# Patient Record
Sex: FEMALE | Race: OTHER | Employment: UNEMPLOYED | URBAN - METROPOLITAN AREA
[De-identification: names, ages, dates, MRNs, and addresses within clinical notes are randomized per-mention and may not be internally consistent; named-entity substitution may affect disease eponyms.]

---

## 2023-09-26 ENCOUNTER — OFFICE VISIT (OUTPATIENT)
Age: 23
End: 2023-09-26
Payer: MEDICAID

## 2023-09-26 VITALS
OXYGEN SATURATION: 97 % | HEIGHT: 63 IN | TEMPERATURE: 98.4 F | HEART RATE: 54 BPM | RESPIRATION RATE: 18 BRPM | DIASTOLIC BLOOD PRESSURE: 75 MMHG | BODY MASS INDEX: 31.13 KG/M2 | WEIGHT: 175.71 LBS | SYSTOLIC BLOOD PRESSURE: 108 MMHG

## 2023-09-26 DIAGNOSIS — Z11.3 ROUTINE SCREENING FOR STI (SEXUALLY TRANSMITTED INFECTION): ICD-10-CM

## 2023-09-26 DIAGNOSIS — Z01.419 WELL WOMAN EXAM WITH ROUTINE GYNECOLOGICAL EXAM: Primary | ICD-10-CM

## 2023-09-26 DIAGNOSIS — Z23 NEED FOR INFLUENZA VACCINATION: ICD-10-CM

## 2023-09-26 PROCEDURE — 99385 PREV VISIT NEW AGE 18-39: CPT | Performed by: FAMILY MEDICINE

## 2023-09-26 PROCEDURE — PBSHW INFLUENZA, FLUCELVAX, (AGE 6 MO+), IM, PF, 0.5 ML: Performed by: FAMILY MEDICINE

## 2023-09-26 PROCEDURE — 90674 CCIIV4 VAC NO PRSV 0.5 ML IM: CPT | Performed by: FAMILY MEDICINE

## 2023-09-26 ASSESSMENT — ENCOUNTER SYMPTOMS
RESPIRATORY NEGATIVE: 1
GASTROINTESTINAL NEGATIVE: 1
EYES NEGATIVE: 1
ALLERGIC/IMMUNOLOGIC NEGATIVE: 1

## 2023-09-26 ASSESSMENT — PATIENT HEALTH QUESTIONNAIRE - PHQ9
7. TROUBLE CONCENTRATING ON THINGS, SUCH AS READING THE NEWSPAPER OR WATCHING TELEVISION: 0
6. FEELING BAD ABOUT YOURSELF - OR THAT YOU ARE A FAILURE OR HAVE LET YOURSELF OR YOUR FAMILY DOWN: 0
8. MOVING OR SPEAKING SO SLOWLY THAT OTHER PEOPLE COULD HAVE NOTICED. OR THE OPPOSITE, BEING SO FIGETY OR RESTLESS THAT YOU HAVE BEEN MOVING AROUND A LOT MORE THAN USUAL: 0
3. TROUBLE FALLING OR STAYING ASLEEP: 0
9. THOUGHTS THAT YOU WOULD BE BETTER OFF DEAD, OR OF HURTING YOURSELF: 0
2. FEELING DOWN, DEPRESSED OR HOPELESS: 1
SUM OF ALL RESPONSES TO PHQ9 QUESTIONS 1 & 2: 2
SUM OF ALL RESPONSES TO PHQ QUESTIONS 1-9: 2
10. IF YOU CHECKED OFF ANY PROBLEMS, HOW DIFFICULT HAVE THESE PROBLEMS MADE IT FOR YOU TO DO YOUR WORK, TAKE CARE OF THINGS AT HOME, OR GET ALONG WITH OTHER PEOPLE: 0
SUM OF ALL RESPONSES TO PHQ QUESTIONS 1-9: 2
1. LITTLE INTEREST OR PLEASURE IN DOING THINGS: 1
5. POOR APPETITE OR OVEREATING: 0
SUM OF ALL RESPONSES TO PHQ QUESTIONS 1-9: 2
4. FEELING TIRED OR HAVING LITTLE ENERGY: 0
SUM OF ALL RESPONSES TO PHQ QUESTIONS 1-9: 2

## 2023-09-26 NOTE — PROGRESS NOTES
After obtaining consent, and per orders of Dr. Oscar Ascencio, injection of influenza given in Right deltoid by Tess Rodriguez LPN. Patient instructed to remain in clinic for 20 minutes afterwards, and to report any adverse reaction to me immediately.

## 2023-09-26 NOTE — PROGRESS NOTES
Ignacio Hernandez (:  2000) is a 23 y.o. female,New patient, here for evaluation of the following chief complaint(s):  [facility] ([phone])    Subjective   SUBJECTIVE/OBJECTIVE:  Pt is a [id number] presenting for WWE with her friend, Yariel Doe. Age of menarche:  [age] yo  Menses: regular monthly. No concerns  Complications of menses:  none however, she is unsure if she has iron def anemia. Hx of STI: none  Current relationship status:   Contraception:  nothing. STI screening discussed:  ok with STI screening today  Pap smear: due today  Vaccinations: flu vaccination today  Done with  [id number]    History reviewed. No pertinent past medical history. History reviewed. No pertinent surgical history. Family History   Problem Relation Age of Onset    No Known Problems Mother     No Known Problems Father     No Known Problems Sister     No Known Problems Brother      Social History     Socioeconomic History    Marital status:      Spouse name: Not on file    Number of children: Not on file    Years of education: Not on file    Highest education level: Not on file   Occupational History    Not on file   Tobacco Use    Smoking status: Never    Smokeless tobacco: Never   Substance and Sexual Activity    Alcohol use: Not Currently    Drug use: Never    Sexual activity: Not on file   Other Topics Concern    Not on file   Social History Narrative    Not on file     Social Determinants of Health     Financial Resource Strain: Not on file   Food Insecurity: Not on file   Transportation Needs: Not on file   Physical Activity: Not on file   Stress: Not on file   Social Connections: Not on file   Intimate Partner Violence: Not on file   Housing Stability: Not on file     No current outpatient medications on file. No Known Allergies    Review of Systems   Constitutional: Negative. HENT: Negative. Eyes: Negative. Respiratory: Negative. Cardiovascular: Negative.

## 2023-09-27 LAB
25(OH)D3 SERPL-MCNC: <9 NG/ML (ref 30–100)
ALBUMIN SERPL-MCNC: 4 G/DL (ref 3.5–5)
ALBUMIN/GLOB SERPL: 1.2 (ref 1.1–2.2)
ALP SERPL-CCNC: 72 U/L (ref 45–117)
ALT SERPL-CCNC: 20 U/L (ref 12–78)
ANION GAP SERPL CALC-SCNC: 6 MMOL/L (ref 5–15)
AST SERPL-CCNC: 12 U/L (ref 15–37)
BASOPHILS # BLD: 0 K/UL (ref 0–0.1)
BASOPHILS NFR BLD: 1 % (ref 0–1)
BILIRUB SERPL-MCNC: 0.4 MG/DL (ref 0.2–1)
BUN SERPL-MCNC: 8 MG/DL (ref 6–20)
BUN/CREAT SERPL: 14 (ref 12–20)
CALCIUM SERPL-MCNC: 9.3 MG/DL (ref 8.5–10.1)
CHLORIDE SERPL-SCNC: 109 MMOL/L (ref 97–108)
CHOLEST SERPL-MCNC: 135 MG/DL
CO2 SERPL-SCNC: 25 MMOL/L (ref 21–32)
CREAT SERPL-MCNC: 0.57 MG/DL (ref 0.55–1.02)
DIFFERENTIAL METHOD BLD: NORMAL
EOSINOPHIL # BLD: 0.1 K/UL (ref 0–0.4)
EOSINOPHIL NFR BLD: 2 % (ref 0–7)
ERYTHROCYTE [DISTWIDTH] IN BLOOD BY AUTOMATED COUNT: 13.2 % (ref 11.5–14.5)
EST. AVERAGE GLUCOSE BLD GHB EST-MCNC: 117 MG/DL
GLOBULIN SER CALC-MCNC: 3.4 G/DL (ref 2–4)
GLUCOSE SERPL-MCNC: 90 MG/DL (ref 65–100)
HBA1C MFR BLD: 5.7 % (ref 4–5.6)
HCT VFR BLD AUTO: 40 % (ref 35–47)
HDLC SERPL-MCNC: 44 MG/DL
HDLC SERPL: 3.1 (ref 0–5)
HGB BLD-MCNC: 12.8 G/DL (ref 11.5–16)
HIV 1+2 AB+HIV1 P24 AG SERPL QL IA: NONREACTIVE
HIV 1/2 RESULT COMMENT: NORMAL
IMM GRANULOCYTES # BLD AUTO: 0 K/UL (ref 0–0.04)
IMM GRANULOCYTES NFR BLD AUTO: 0 % (ref 0–0.5)
LDLC SERPL CALC-MCNC: 69 MG/DL (ref 0–100)
LYMPHOCYTES # BLD: 1.8 K/UL (ref 0.8–3.5)
LYMPHOCYTES NFR BLD: 32 % (ref 12–49)
MCH RBC QN AUTO: 28.3 PG (ref 26–34)
MCHC RBC AUTO-ENTMCNC: 32 G/DL (ref 30–36.5)
MCV RBC AUTO: 88.3 FL (ref 80–99)
MONOCYTES # BLD: 0.4 K/UL (ref 0–1)
MONOCYTES NFR BLD: 8 % (ref 5–13)
NEUTS SEG # BLD: 3.1 K/UL (ref 1.8–8)
NEUTS SEG NFR BLD: 57 % (ref 32–75)
NRBC # BLD: 0 K/UL (ref 0–0.01)
NRBC BLD-RTO: 0 PER 100 WBC
PLATELET # BLD AUTO: 351 K/UL (ref 150–400)
PMV BLD AUTO: 11.8 FL (ref 8.9–12.9)
POTASSIUM SERPL-SCNC: 3.8 MMOL/L (ref 3.5–5.1)
PROT SERPL-MCNC: 7.4 G/DL (ref 6.4–8.2)
RBC # BLD AUTO: 4.53 M/UL (ref 3.8–5.2)
SODIUM SERPL-SCNC: 140 MMOL/L (ref 136–145)
TRIGL SERPL-MCNC: 110 MG/DL
TSH SERPL DL<=0.05 MIU/L-ACNC: 4.7 UIU/ML (ref 0.36–3.74)
VLDLC SERPL CALC-MCNC: 22 MG/DL
WBC # BLD AUTO: 5.4 K/UL (ref 3.6–11)

## 2023-09-28 DIAGNOSIS — R79.89 ELEVATED TSH: ICD-10-CM

## 2023-09-28 DIAGNOSIS — E55.9 VITAMIN D DEFICIENCY: Primary | ICD-10-CM

## 2023-09-28 RX ORDER — ERGOCALCIFEROL 1.25 MG/1
50000 CAPSULE ORAL WEEKLY
Qty: 12 CAPSULE | Refills: 1 | Status: SHIPPED | OUTPATIENT
Start: 2023-09-28

## 2023-10-01 LAB
C TRACH RRNA CVX QL NAA+PROBE: NEGATIVE
CYTOLOGIST CVX/VAG CYTO: NORMAL
CYTOLOGY CVX/VAG DOC CYTO: NORMAL
CYTOLOGY CVX/VAG DOC THIN PREP: NORMAL
DX ICD CODE: NORMAL
HPV GENOTYPE REFLEX: NORMAL
HPV I/H RISK 4 DNA CVX QL PROBE+SIG AMP: NEGATIVE
Lab: NORMAL
N GONORRHOEA RRNA CVX QL NAA+PROBE: NEGATIVE
OTHER STN SPEC: NORMAL
STAT OF ADQ CVX/VAG CYTO-IMP: NORMAL
T VAGINALIS RRNA SPEC QL NAA+PROBE: NEGATIVE

## 2023-12-05 ENCOUNTER — TELEPHONE (OUTPATIENT)
Age: 23
End: 2023-12-05

## 2023-12-05 NOTE — TELEPHONE ENCOUNTER
Eunice was called because the [employer] from she dropped off for patient Kolby has the wrong date of birth printed on the form. because this is a government form all patient identifiers must be met. Eunice didn't answer but a VM was left on [phone] advising to come  the INCOMPLETE form.

## 2024-02-01 ENCOUNTER — OFFICE VISIT (OUTPATIENT)
Age: 24
End: 2024-02-01
Payer: MEDICAID

## 2024-02-01 VITALS
DIASTOLIC BLOOD PRESSURE: 70 MMHG | HEIGHT: 63 IN | TEMPERATURE: 98.2 F | OXYGEN SATURATION: 98 % | SYSTOLIC BLOOD PRESSURE: 102 MMHG | RESPIRATION RATE: 16 BRPM | WEIGHT: 170 LBS | HEART RATE: 71 BPM | BODY MASS INDEX: 30.12 KG/M2

## 2024-02-01 DIAGNOSIS — N89.8 VAGINAL ITCHING: ICD-10-CM

## 2024-02-01 DIAGNOSIS — R30.0 DYSURIA: Primary | ICD-10-CM

## 2024-02-01 LAB
BILIRUBIN, POC: ABNORMAL
BLOOD URINE, POC: ABNORMAL
CLARITY, POC: ABNORMAL
COLOR, POC: ABNORMAL
GLUCOSE URINE, POC: ABNORMAL
HCG, PREGNANCY, URINE, POC: NEGATIVE
KETONES, POC: ABNORMAL
LEUKOCYTE EST, POC: ABNORMAL
NITRITE, POC: ABNORMAL
PH, POC: 5.5
PROTEIN, POC: ABNORMAL
SPECIFIC GRAVITY, POC: 1.03
UROBILINOGEN, POC: ABNORMAL
VALID INTERNAL CONTROL, POC: YES

## 2024-02-01 PROCEDURE — 81003 URINALYSIS AUTO W/O SCOPE: CPT | Performed by: FAMILY MEDICINE

## 2024-02-01 PROCEDURE — 99214 OFFICE O/P EST MOD 30 MIN: CPT | Performed by: FAMILY MEDICINE

## 2024-02-01 PROCEDURE — PBSHW POCT URINALYSIS DIPSTICK W/O MICROSCOPE (AUTO): Performed by: FAMILY MEDICINE

## 2024-02-01 PROCEDURE — 81025 URINE PREGNANCY TEST: CPT | Performed by: FAMILY MEDICINE

## 2024-02-01 PROCEDURE — PBSHW AMB POC URINE PREGNANCY TEST, VISUAL COLOR COMPARISON: Performed by: FAMILY MEDICINE

## 2024-02-01 RX ORDER — METRONIDAZOLE 500 MG/1
500 TABLET ORAL 2 TIMES DAILY
Qty: 14 TABLET | Refills: 0 | Status: SHIPPED | OUTPATIENT
Start: 2024-02-01 | End: 2024-02-08

## 2024-02-01 SDOH — ECONOMIC STABILITY: FOOD INSECURITY: WITHIN THE PAST 12 MONTHS, YOU WORRIED THAT YOUR FOOD WOULD RUN OUT BEFORE YOU GOT MONEY TO BUY MORE.: NEVER TRUE

## 2024-02-01 SDOH — ECONOMIC STABILITY: INCOME INSECURITY: HOW HARD IS IT FOR YOU TO PAY FOR THE VERY BASICS LIKE FOOD, HOUSING, MEDICAL CARE, AND HEATING?: NOT HARD AT ALL

## 2024-02-01 SDOH — ECONOMIC STABILITY: FOOD INSECURITY: WITHIN THE PAST 12 MONTHS, THE FOOD YOU BOUGHT JUST DIDN'T LAST AND YOU DIDN'T HAVE MONEY TO GET MORE.: NEVER TRUE

## 2024-02-01 SDOH — ECONOMIC STABILITY: HOUSING INSECURITY
IN THE LAST 12 MONTHS, WAS THERE A TIME WHEN YOU DID NOT HAVE A STEADY PLACE TO SLEEP OR SLEPT IN A SHELTER (INCLUDING NOW)?: NO

## 2024-02-01 ASSESSMENT — PATIENT HEALTH QUESTIONNAIRE - PHQ9
SUM OF ALL RESPONSES TO PHQ QUESTIONS 1-9: 0
2. FEELING DOWN, DEPRESSED OR HOPELESS: 0
SUM OF ALL RESPONSES TO PHQ QUESTIONS 1-9: 0
1. LITTLE INTEREST OR PLEASURE IN DOING THINGS: 0
SUM OF ALL RESPONSES TO PHQ QUESTIONS 1-9: 0
SUM OF ALL RESPONSES TO PHQ QUESTIONS 1-9: 0
SUM OF ALL RESPONSES TO PHQ9 QUESTIONS 1 & 2: 0

## 2024-02-01 ASSESSMENT — ENCOUNTER SYMPTOMS
ABDOMINAL PAIN: 0
VOMITING: 0
DIARRHEA: 0
NAUSEA: 0

## 2024-02-01 NOTE — PROGRESS NOTES
Rm:01    Chief Complaint   Patient presents with    Urinary Pain    Vaginal Itching        1. Have you been to the ER, urgent care clinic since your last visit?  Hospitalized since your last visit?no    2. Have you seen or consulted any other health care providers outside of the [facility] since your last visit?  Include any pap smears or colon screening. no        Social Determinants of Health     Tobacco Use: Low Risk  (2/1/2024)    Patient History     Smoking Tobacco Use: Never     Smokeless Tobacco Use: Never     Passive Exposure: Not on file   Alcohol Use: Not on file   Financial Resource Strain: Not on file   Food Insecurity: Not on file   Transportation Needs: Not on file   Physical Activity: Not on file   Stress: Not on file   Social Connections: Not on file   Intimate Partner Violence: Not on file   Depression: Not at risk (9/26/2023)    PHQ-2     PHQ-2 Score: 2   Housing Stability: Not on file   Interpersonal Safety: Not on file   Utilities: Not on file

## 2024-02-01 NOTE — PROGRESS NOTES
Kolby Butler (:  2000) is a 23 y.o. female,Established patient, here for evaluation of the following chief complaint(s):  Urinary Pain and Vaginal Itching         ASSESSMENT/PLAN:  1. Dysuria  -     POCT Urinalysis No Micro (Auto)  -     Culture, Urine; Future  2. Vaginal itching  -     Nuswab Vaginitis Plus (VG+); Future  -     AMB POC URINE PREGNANCY TEST, VISUAL COLOR COMPARISON  -     metroNIDAZOLE (FLAGYL) 500 MG tablet; Take 1 tablet by mouth 2 times daily for 7 days, Disp-14 tablet, R-0Normal  Awaiting urine culture. Neg UPT. Empiric therapy for BV while awaiting nuswab results. Clinically sx not suggestive of yeast vaginitis at this time.     Return if symptoms worsen or fail to improve.             Subjective   SUBJECTIVE/OBJECTIVE:  Pt presents as an est pt for acute concern  Pt presents with her friend, Leda.   History and physical done with  #[id number]    1. Dysuria/vaginal itching:   Duration: 1 wk  Location: urinary  Quality: burning  Pain scale: 8/10  Aggrav sx: nothing  Allev sx:  states she has been using clotrimazole cream prn OTC. They tried monistat OTC 2 mo ago that initially improved but then worsened for the past week  Assoc sx:   Patient's last menstrual period was 01/15/2024. Unsure of the exact date.   No contraception  +vaginal itching without discharge or odor.   Denies any f/c/n/v/d/c/abd pain/hematuria/back pain/+urinary frequency.         Past Medical History:   Diagnosis Date    Vitamin D deficiency 2023     History reviewed. No pertinent surgical history.   Family History   Problem Relation Age of Onset    No Known Problems Mother     No Known Problems Father     No Known Problems Sister     No Known Problems Brother      Social History     Socioeconomic History    Marital status:      Spouse name: Not on file    Number of children: Not on file    Years of education: Not on file    Highest education level: Not on file   Occupational History    Not on

## 2024-02-04 LAB
A VAGINAE DNA VAG QL NAA+PROBE: NORMAL SCORE
BACTERIA SPEC CULT: ABNORMAL
BVAB2 DNA VAG QL NAA+PROBE: NORMAL SCORE
C ALBICANS DNA VAG QL NAA+PROBE: NEGATIVE
C GLABRATA DNA VAG QL NAA+PROBE: NEGATIVE
CC UR VC: ABNORMAL
MEGA1 DNA VAG QL NAA+PROBE: NORMAL SCORE
SERVICE CMNT-IMP: ABNORMAL
SPECIMEN SOURCE: NORMAL

## 2024-02-05 DIAGNOSIS — N30.00 ACUTE CYSTITIS WITHOUT HEMATURIA: Primary | ICD-10-CM

## 2024-02-05 RX ORDER — CEFDINIR 300 MG/1
300 CAPSULE ORAL 2 TIMES DAILY
Qty: 14 CAPSULE | Refills: 0 | Status: SHIPPED | OUTPATIENT
Start: 2024-02-05 | End: 2024-02-12

## 2024-02-06 LAB
A VAGINAE DNA VAG QL NAA+PROBE: NORMAL SCORE
BVAB2 DNA VAG QL NAA+PROBE: NORMAL SCORE
C ALBICANS DNA VAG QL NAA+PROBE: NEGATIVE
C GLABRATA DNA VAG QL NAA+PROBE: NEGATIVE
C TRACH RRNA SPEC QL NAA+PROBE: NEGATIVE
MEGA1 DNA VAG QL NAA+PROBE: NORMAL SCORE
N GONORRHOEA RRNA SPEC QL NAA+PROBE: NEGATIVE
SPECIMEN SOURCE: NORMAL
T VAGINALIS RRNA SPEC QL NAA+PROBE: NEGATIVE

## 2024-02-07 ENCOUNTER — TELEPHONE (OUTPATIENT)
Age: 24
End: 2024-02-07

## 2024-02-07 NOTE — TELEPHONE ENCOUNTER
----- Message from Aissatou Reed MD sent at 2/5/2024  1:43 PM EST -----  Call:  your urine was positive for a urinary tract infection.  You may discontinue the current antibiotic and a new one is being sent to the pharmacy for this infection.  Take as prescribed and complete the course of medication.  Return if you do not improve. Have a great weekend.     (Treated with cefdinir third gen cephalosporin due to sensitivities of rocephin. Otherwise pansensitive except for bactrim.)

## 2024-02-07 NOTE — TELEPHONE ENCOUNTER
Spoke with pt using Language Services. I informed the pt that her results came back and that she has a UTI. Per Dr. Reed's orders I told the pt to discontinue using the medications that were previously prescribed on 02/01/24 and to  her new medication that was sent to her local pharmacy. No further questions or concerns.

## 2024-06-10 ENCOUNTER — TELEPHONE (OUTPATIENT)
Age: 24
End: 2024-06-10

## 2024-06-10 NOTE — TELEPHONE ENCOUNTER
Patient is requesting a medication to be called in to her pharmacy for vaginal itching and discharge. Patient is requesting medication due to no appointment availability.

## 2024-06-10 NOTE — TELEPHONE ENCOUNTER
Spoke to patient to use OTC for vaginal itching/cream. She voiced understanding. I asked if she wanted to make an appointment and she said she will wait.

## 2024-06-27 ENCOUNTER — OFFICE VISIT (OUTPATIENT)
Age: 24
End: 2024-06-27
Payer: MEDICAID

## 2024-06-27 VITALS
HEIGHT: 63 IN | HEART RATE: 68 BPM | SYSTOLIC BLOOD PRESSURE: 104 MMHG | BODY MASS INDEX: 31.43 KG/M2 | WEIGHT: 177.4 LBS | OXYGEN SATURATION: 98 % | DIASTOLIC BLOOD PRESSURE: 73 MMHG | TEMPERATURE: 98.8 F | RESPIRATION RATE: 18 BRPM

## 2024-06-27 DIAGNOSIS — R30.0 DYSURIA: Primary | ICD-10-CM

## 2024-06-27 LAB
BILIRUBIN, URINE, POC: NEGATIVE
BLOOD URINE, POC: NEGATIVE
GLUCOSE URINE, POC: NEGATIVE
KETONES, URINE, POC: NEGATIVE
LEUKOCYTE ESTERASE, URINE, POC: NEGATIVE
NITRITE, URINE, POC: NEGATIVE
PH, URINE, POC: 6 (ref 4.6–8)
PROTEIN,URINE, POC: NEGATIVE
SPECIFIC GRAVITY, URINE, POC: 1.03 (ref 1–1.03)
URINALYSIS CLARITY, POC: CLEAR
URINALYSIS COLOR, POC: YELLOW
UROBILINOGEN, POC: NORMAL

## 2024-06-27 PROCEDURE — 99213 OFFICE O/P EST LOW 20 MIN: CPT | Performed by: FAMILY MEDICINE

## 2024-06-27 PROCEDURE — 81003 URINALYSIS AUTO W/O SCOPE: CPT | Performed by: FAMILY MEDICINE

## 2024-06-27 PROCEDURE — PBSHW AMB POC URINALYSIS DIP STICK AUTO W/O MICRO: Performed by: FAMILY MEDICINE

## 2024-06-27 RX ORDER — CEFDINIR 300 MG/1
300 CAPSULE ORAL 2 TIMES DAILY
Qty: 14 CAPSULE | Refills: 0 | Status: SHIPPED | OUTPATIENT
Start: 2024-06-27 | End: 2024-07-04

## 2024-06-27 SDOH — ECONOMIC STABILITY: FOOD INSECURITY: WITHIN THE PAST 12 MONTHS, YOU WORRIED THAT YOUR FOOD WOULD RUN OUT BEFORE YOU GOT MONEY TO BUY MORE.: NEVER TRUE

## 2024-06-27 SDOH — ECONOMIC STABILITY: FOOD INSECURITY: WITHIN THE PAST 12 MONTHS, THE FOOD YOU BOUGHT JUST DIDN'T LAST AND YOU DIDN'T HAVE MONEY TO GET MORE.: NEVER TRUE

## 2024-06-27 SDOH — ECONOMIC STABILITY: INCOME INSECURITY: HOW HARD IS IT FOR YOU TO PAY FOR THE VERY BASICS LIKE FOOD, HOUSING, MEDICAL CARE, AND HEATING?: NOT HARD AT ALL

## 2024-06-27 ASSESSMENT — PATIENT HEALTH QUESTIONNAIRE - PHQ9
SUM OF ALL RESPONSES TO PHQ QUESTIONS 1-9: 0
1. LITTLE INTEREST OR PLEASURE IN DOING THINGS: NOT AT ALL
2. FEELING DOWN, DEPRESSED OR HOPELESS: NOT AT ALL
SUM OF ALL RESPONSES TO PHQ9 QUESTIONS 1 & 2: 0
SUM OF ALL RESPONSES TO PHQ QUESTIONS 1-9: 0

## 2024-06-27 NOTE — PATIENT INSTRUCTIONS
Take the medication as prescribed.  You will be notified of the culture results and if there is a change needed in the antibiotic.  You can try over the counter vaginal itch creams like vagisil to help in the meantime. Make sure to try to urinate after intercourse, no submersion bathing (only showers), and wiping/cleaning from front to back.

## 2024-06-27 NOTE — PROGRESS NOTES
Rm:02    Chief Complaint   Patient presents with    Vaginal Itching    Tried OTC and it helped but symptoms came back after running out of medication.   No burning when peeing   For about a month     \"Have you been to the ER, urgent care clinic since your last visit?  Hospitalized since your last visit?\"    NO    “Have you seen or consulted any other health care providers outside of [facility] since your last visit?”    NO            Click Here for Release of Records Request   
children: Not on file    Years of education: Not on file    Highest education level: Not on file   Occupational History    Not on file   Tobacco Use    Smoking status: Never    Smokeless tobacco: Never   Substance and Sexual Activity    Alcohol use: Not Currently    Drug use: Never    Sexual activity: Not on file   Other Topics Concern    Not on file   Social History Narrative    Not on file     Social Determinants of Health     Financial Resource Strain: Low Risk  (6/27/2024)    Overall Financial Resource Strain (CARDIA)     Difficulty of Paying Living Expenses: Not hard at all   Food Insecurity: No Food Insecurity (6/27/2024)    Hunger Vital Sign     Worried About Running Out of Food in the Last Year: Never true     Ran Out of Food in the Last Year: Never true   Transportation Needs: Unknown (6/27/2024)    PRAPARE - Transportation     Lack of Transportation (Medical): Not on file     Lack of Transportation (Non-Medical): No   Physical Activity: Not on file   Stress: Not on file   Social Connections: Not on file   Intimate Partner Violence: Not on file   Housing Stability: Unknown (6/27/2024)    Housing Stability Vital Sign     Unable to Pay for Housing in the Last Year: Not on file     Number of Places Lived in the Last Year: Not on file     Unstable Housing in the Last Year: No       Current Outpatient Medications:     cefdinir (OMNICEF) 300 MG capsule, Take 1 capsule by mouth 2 times daily for 7 days, Disp: 14 capsule, Rfl: 0    vitamin D (ERGOCALCIFEROL) 1.25 MG (15271 UT) CAPS capsule, Take 1 capsule by mouth once a week, Disp: 12 capsule, Rfl: 1  No Known Allergies    Review of Systems   Constitutional: Negative.    Gastrointestinal: Negative.    Genitourinary:  Positive for dysuria. Negative for frequency, hematuria, urgency, vaginal bleeding, vaginal discharge and vaginal pain.          Objective   Vitals:    06/27/24 1406   BP: 104/73   Site: Left Upper Arm   Position: Sitting   Cuff Size: Large Adult

## 2024-06-28 LAB
BACTERIA SPEC CULT: NORMAL
CC UR VC: NORMAL
SERVICE CMNT-IMP: NORMAL

## 2024-07-01 ASSESSMENT — ENCOUNTER SYMPTOMS: GASTROINTESTINAL NEGATIVE: 1

## 2024-08-05 ENCOUNTER — TELEPHONE (OUTPATIENT)
Age: 24
End: 2024-08-05

## 2024-08-05 NOTE — TELEPHONE ENCOUNTER
I spoke with the patient using Language services ([id number]). I explained that  would like to see the patient in office to get a urine sample and see what is the cause of there reoccurring infections.  needs to make sure that the medication compatible with the infections. Appt made for 08/12/2024 patient did not want anything this week.

## 2024-08-05 NOTE — TELEPHONE ENCOUNTER
Pt requests (via [facility] ) refill to treat recurring issue in her private area; pt could not remember the name of the RX. Pt refused an appt to see provider on 8/6/24 and would like a refill only. Please CB pt to verify RX and follow-up with pt.

## 2024-08-12 ENCOUNTER — OFFICE VISIT (OUTPATIENT)
Age: 24
End: 2024-08-12
Payer: MEDICAID

## 2024-08-12 VITALS
SYSTOLIC BLOOD PRESSURE: 114 MMHG | DIASTOLIC BLOOD PRESSURE: 76 MMHG | RESPIRATION RATE: 18 BRPM | HEIGHT: 63 IN | HEART RATE: 90 BPM | WEIGHT: 178.2 LBS | OXYGEN SATURATION: 97 % | BODY MASS INDEX: 31.57 KG/M2 | TEMPERATURE: 98.5 F

## 2024-08-12 DIAGNOSIS — L30.4 INTERTRIGO: Primary | ICD-10-CM

## 2024-08-12 DIAGNOSIS — R30.0 DYSURIA: ICD-10-CM

## 2024-08-12 DIAGNOSIS — R79.89 ELEVATED TSH: ICD-10-CM

## 2024-08-12 DIAGNOSIS — E55.9 VITAMIN D DEFICIENCY: ICD-10-CM

## 2024-08-12 LAB
BILIRUBIN, URINE, POC: NEGATIVE
BLOOD URINE, POC: NEGATIVE
GLUCOSE URINE, POC: NEGATIVE
HCG, PREGNANCY, URINE, POC: NEGATIVE
KETONES, URINE, POC: NEGATIVE
LEUKOCYTE ESTERASE, URINE, POC: NEGATIVE
NITRITE, URINE, POC: NEGATIVE
PH, URINE, POC: 7 (ref 4.6–8)
PROTEIN,URINE, POC: NEGATIVE
SPECIFIC GRAVITY, URINE, POC: 1.01 (ref 1–1.03)
URINALYSIS CLARITY, POC: CLEAR
URINALYSIS COLOR, POC: YELLOW
UROBILINOGEN, POC: NORMAL
VALID INTERNAL CONTROL, POC: YES

## 2024-08-12 PROCEDURE — PBSHW AMB POC URINE PREGNANCY TEST, VISUAL COLOR COMPARISON: Performed by: FAMILY MEDICINE

## 2024-08-12 PROCEDURE — PBSHW AMB POC URINALYSIS DIP STICK AUTO W/O MICRO: Performed by: FAMILY MEDICINE

## 2024-08-12 PROCEDURE — 99214 OFFICE O/P EST MOD 30 MIN: CPT | Performed by: FAMILY MEDICINE

## 2024-08-12 PROCEDURE — 81025 URINE PREGNANCY TEST: CPT | Performed by: FAMILY MEDICINE

## 2024-08-12 PROCEDURE — 81003 URINALYSIS AUTO W/O SCOPE: CPT | Performed by: FAMILY MEDICINE

## 2024-08-12 RX ORDER — CLOTRIMAZOLE 1 %
CREAM (GRAM) TOPICAL
Qty: 60 G | Refills: 0 | Status: SHIPPED | OUTPATIENT
Start: 2024-08-12 | End: 2024-08-19

## 2024-08-12 SDOH — ECONOMIC STABILITY: FOOD INSECURITY: WITHIN THE PAST 12 MONTHS, THE FOOD YOU BOUGHT JUST DIDN'T LAST AND YOU DIDN'T HAVE MONEY TO GET MORE.: NEVER TRUE

## 2024-08-12 SDOH — ECONOMIC STABILITY: FOOD INSECURITY: WITHIN THE PAST 12 MONTHS, YOU WORRIED THAT YOUR FOOD WOULD RUN OUT BEFORE YOU GOT MONEY TO BUY MORE.: NEVER TRUE

## 2024-08-12 SDOH — ECONOMIC STABILITY: INCOME INSECURITY: HOW HARD IS IT FOR YOU TO PAY FOR THE VERY BASICS LIKE FOOD, HOUSING, MEDICAL CARE, AND HEATING?: NOT HARD AT ALL

## 2024-08-12 ASSESSMENT — PATIENT HEALTH QUESTIONNAIRE - PHQ9
SUM OF ALL RESPONSES TO PHQ QUESTIONS 1-9: 0
SUM OF ALL RESPONSES TO PHQ9 QUESTIONS 1 & 2: 0
2. FEELING DOWN, DEPRESSED OR HOPELESS: NOT AT ALL
SUM OF ALL RESPONSES TO PHQ QUESTIONS 1-9: 0
1. LITTLE INTEREST OR PLEASURE IN DOING THINGS: NOT AT ALL

## 2024-08-12 ASSESSMENT — ENCOUNTER SYMPTOMS: GASTROINTESTINAL NEGATIVE: 1

## 2024-08-12 NOTE — PROGRESS NOTES
RM : 01    Chief Complaint   Patient presents with    Vaginal Itching       Vitals:    08/12/24 1352   BP: 114/76   Pulse: 90   Resp: 18   Temp: 98.5 °F (36.9 °C)   SpO2: 97%         \"Have you been to the ER, urgent care clinic since your last visit?  Hospitalized since your last visit?\"    NO    “Have you seen or consulted any other health care providers outside of [facility] since your last visit?”    NO            Click Here for Release of Records Request      AVS  education, follow up, and recommendations provided and addressed with patient.   
using toothpaste.   Patient's last menstrual period was 08/01/2024 (exact date).  ROS: denies any f/n/v/abd pain/+vaginal discharge white/+vaginal itching/denies any hematuria or urinary urgency or frequency.   Denies any new partners and otherwise low risk for STI.   Currently .   Denies any vaginal discharge or odor.           Past Medical History:   Diagnosis Date    Vitamin D deficiency 9/28/2023     History reviewed. No pertinent surgical history.   Family History   Problem Relation Age of Onset    No Known Problems Mother     No Known Problems Father     No Known Problems Sister     No Known Problems Brother      Social History     Socioeconomic History    Marital status:      Spouse name: Not on file    Number of children: Not on file    Years of education: Not on file    Highest education level: Not on file   Occupational History    Not on file   Tobacco Use    Smoking status: Never    Smokeless tobacco: Never   Substance and Sexual Activity    Alcohol use: Not Currently    Drug use: Never    Sexual activity: Not on file   Other Topics Concern    Not on file   Social History Narrative    Not on file     Social Determinants of Health     Financial Resource Strain: Low Risk  (8/12/2024)    Overall Financial Resource Strain (CARDIA)     Difficulty of Paying Living Expenses: Not hard at all   Food Insecurity: No Food Insecurity (8/12/2024)    Hunger Vital Sign     Worried About Running Out of Food in the Last Year: Never true     Ran Out of Food in the Last Year: Never true   Transportation Needs: Unknown (8/12/2024)    PRAPARE - Transportation     Lack of Transportation (Medical): Not on file     Lack of Transportation (Non-Medical): No   Physical Activity: Not on file   Stress: Not on file   Social Connections: Not on file   Intimate Partner Violence: Not on file   Housing Stability: Unknown (8/12/2024)    Housing Stability Vital Sign     Unable to Pay for Housing in the Last Year: Not on file

## 2024-08-12 NOTE — PATIENT INSTRUCTIONS
Your cultures are being sent for which will determine any additional medication recommendations.   Use the topical cream as directed  Try to cut back on waxing as that may increase the likelihood of the itching.

## 2024-08-13 LAB
25(OH)D3 SERPL-MCNC: 12.7 NG/ML (ref 30–100)
BACTERIA SPEC CULT: ABNORMAL
CC UR VC: ABNORMAL
SERVICE CMNT-IMP: ABNORMAL

## 2024-08-13 NOTE — RESULT ENCOUNTER NOTE
Letter:  all of your labs are within acceptable limits except:   1. The vitamin D is low. You can improve this level with vitamin D 2422-8756 units daily.    Continue with the recommendations as discussed at your appt. Have a great week.

## 2024-08-14 ENCOUNTER — TELEPHONE (OUTPATIENT)
Age: 24
End: 2024-08-14

## 2024-08-14 LAB
Lab: ABNORMAL
T4 FREE SERPL-MCNC: 1.13 NG/DL (ref 0.82–1.77)
THYROID PEROXIDASE ANTIBODY: 55 IU/ML (ref 0–34)
TSH SERPL DL<=0.05 MIU/L-ACNC: 5.52 UIU/ML (ref 0.45–4.5)

## 2024-08-14 NOTE — TELEPHONE ENCOUNTER
----- Message from Dr. Aissatou Reed MD sent at 8/14/2024  2:55 PM EDT -----  Call:  your urine is positive for gram positive cocci which appear to be a contaminant and due to the low bacterial count this is not indicative of a urinary tract infection.  Continue with the recommendations as discussed at your appt.  Return sooner if you do not improve as expected. Have a great weekend.

## 2024-08-14 NOTE — RESULT ENCOUNTER NOTE
Call:  your urine is positive for gram positive cocci which appear to be a contaminant and due to the low bacterial count this is not indicative of a urinary tract infection.  Continue with the recommendations as discussed at your appt.  Return sooner if you do not improve as expected. Have a great weekend.

## 2024-08-14 NOTE — TELEPHONE ENCOUNTER
I have attempted without success to contact this patient by phone to discuss lab results. Patient results are negative for an infection. And a copy of the results are being mailed to the patient.

## 2024-08-16 LAB
A VAGINAE DNA VAG QL NAA+PROBE: NORMAL SCORE
BVAB2 DNA VAG QL NAA+PROBE: NORMAL SCORE
C ALBICANS DNA VAG QL NAA+PROBE: NEGATIVE
C GLABRATA DNA VAG QL NAA+PROBE: NEGATIVE
C TRACH RRNA SPEC QL NAA+PROBE: NEGATIVE
CANDIDA KRUSEI: NEGATIVE
CANDIDA LUSITANIAE, NAA: NEGATIVE
CANDIDA PARAPSILOSIS/TROPICALIS: NEGATIVE
MEGA1 DNA VAG QL NAA+PROBE: NORMAL SCORE
N GONORRHOEA RRNA SPEC QL NAA+PROBE: NEGATIVE
T VAGINALIS RRNA SPEC QL NAA+PROBE: NEGATIVE

## 2024-08-20 DIAGNOSIS — E06.3 HYPOTHYROIDISM DUE TO HASHIMOTO'S THYROIDITIS: Primary | ICD-10-CM

## 2024-08-20 DIAGNOSIS — E03.8 HYPOTHYROIDISM DUE TO HASHIMOTO'S THYROIDITIS: Primary | ICD-10-CM

## 2025-06-04 ENCOUNTER — OFFICE VISIT (OUTPATIENT)
Age: 25
End: 2025-06-04
Payer: MEDICAID

## 2025-06-04 VITALS
DIASTOLIC BLOOD PRESSURE: 61 MMHG | TEMPERATURE: 98.4 F | RESPIRATION RATE: 16 BRPM | BODY MASS INDEX: 31.86 KG/M2 | HEART RATE: 72 BPM | SYSTOLIC BLOOD PRESSURE: 90 MMHG | OXYGEN SATURATION: 99 % | WEIGHT: 179.8 LBS | HEIGHT: 63 IN

## 2025-06-04 DIAGNOSIS — L30.4 INTERTRIGO: Primary | ICD-10-CM

## 2025-06-04 PROCEDURE — 99214 OFFICE O/P EST MOD 30 MIN: CPT | Performed by: FAMILY MEDICINE

## 2025-06-04 RX ORDER — HYDROCORTISONE 25 MG/G
CREAM TOPICAL
Qty: 28 G | Refills: 1 | Status: SHIPPED | OUTPATIENT
Start: 2025-06-04

## 2025-06-04 RX ORDER — CLOTRIMAZOLE 1 %
CREAM (GRAM) TOPICAL
Qty: 60 G | Refills: 1 | Status: SHIPPED | OUTPATIENT
Start: 2025-06-04 | End: 2025-06-11

## 2025-06-04 SDOH — ECONOMIC STABILITY: FOOD INSECURITY: WITHIN THE PAST 12 MONTHS, YOU WORRIED THAT YOUR FOOD WOULD RUN OUT BEFORE YOU GOT MONEY TO BUY MORE.: NEVER TRUE

## 2025-06-04 SDOH — ECONOMIC STABILITY: FOOD INSECURITY: WITHIN THE PAST 12 MONTHS, THE FOOD YOU BOUGHT JUST DIDN'T LAST AND YOU DIDN'T HAVE MONEY TO GET MORE.: NEVER TRUE

## 2025-06-04 ASSESSMENT — PATIENT HEALTH QUESTIONNAIRE - PHQ9
SUM OF ALL RESPONSES TO PHQ QUESTIONS 1-9: 0
2. FEELING DOWN, DEPRESSED OR HOPELESS: NOT AT ALL
SUM OF ALL RESPONSES TO PHQ QUESTIONS 1-9: 0
1. LITTLE INTEREST OR PLEASURE IN DOING THINGS: NOT AT ALL

## 2025-06-04 NOTE — PROGRESS NOTES
RM:2    Chief Complaint   Patient presents with    Acute Visit     Patient is here for vaginal issues. Patient states that she is having severe vaginal itching for last 3 weeks. Patient states that she has had this itching before and was given a cream. Patient states she has nose bleeds in the left nostril over the last week and is bright red.        Fasting No    Vitals:    06/04/25 1624   BP: 90/61   BP Site: Left Upper Arm   Patient Position: Sitting   BP Cuff Size: Large Adult   Pulse: 72   Resp: 16   Temp: 98.4 °F (36.9 °C)   TempSrc: Oral   SpO2: 99%   Weight: 81.6 kg (179 lb 12.8 oz)   Height: 1.6 m (5' 3\")        Have you been to the ER, urgent care clinic since your last visit?  Hospitalized since your last visit?   NO    Have you seen or consulted any other health care providers outside our system since your last visit?   NO       Aydee #[id number]          Click Here for Release of Records Request   AVS  education, follow up, and recommendations provided and addressed with patient.  services used to advise patient   
1 % cream, Apply topically 2 times daily., Disp: 60 g, Rfl: 1    hydrocortisone 2.5 % cream, Apply topically 2 times daily for 1 wk, then apply daily for 1 wk, then every other day for 1 wk., Disp: 28 g, Rfl: 1  No Known Allergies         Objective   Vitals:    06/04/25 1624   BP: 90/61   BP Site: Left Upper Arm   Patient Position: Sitting   BP Cuff Size: Large Adult   Pulse: 72   Resp: 16   Temp: 98.4 °F (36.9 °C)   TempSrc: Oral   SpO2: 99%   Weight: 81.6 kg (179 lb 12.8 oz)   Height: 1.6 m (5' 3\")      Physical Exam  Runny nose and congestion noted  Clear to auscultation, no wheezing, rales or rhonchi  Physical Exam  Vitals and nursing note reviewed.   Constitutional:       General: She is not in acute distress.     Appearance: Normal appearance. She is obese. She is not ill-appearing or toxic-appearing.   HENT:      Head: Normocephalic and atraumatic.      Nose: Congestion and rhinorrhea (clear) present.      Comments: No epistaxis  Cardiovascular:      Rate and Rhythm: Normal rate and regular rhythm.      Heart sounds: Normal heart sounds. No murmur heard.     No friction rub. No gallop.   Pulmonary:      Effort: Pulmonary effort is normal. No respiratory distress.      Breath sounds: Normal breath sounds. No wheezing, rhonchi or rales.   Abdominal:      General: Abdomen is flat. Bowel sounds are normal.      Palpations: Abdomen is soft.      Tenderness: There is no abdominal tenderness. There is no guarding or rebound.   Neurological:      Mental Status: She is alert.              An electronic signature was used to authenticate this note.    --Thomas Reed MD

## 2025-06-11 ENCOUNTER — OFFICE VISIT (OUTPATIENT)
Age: 25
End: 2025-06-11
Payer: MEDICAID

## 2025-06-11 VITALS
OXYGEN SATURATION: 98 % | TEMPERATURE: 98.3 F | HEIGHT: 63 IN | WEIGHT: 181.2 LBS | SYSTOLIC BLOOD PRESSURE: 107 MMHG | DIASTOLIC BLOOD PRESSURE: 74 MMHG | BODY MASS INDEX: 32.11 KG/M2 | RESPIRATION RATE: 16 BRPM | HEART RATE: 64 BPM

## 2025-06-11 DIAGNOSIS — R73.03 PREDIABETES: Primary | ICD-10-CM

## 2025-06-11 LAB
GLUCOSE, POC: 99 MG/DL
HBA1C MFR BLD: 5.6 %

## 2025-06-11 PROCEDURE — 83036 HEMOGLOBIN GLYCOSYLATED A1C: CPT | Performed by: FAMILY MEDICINE

## 2025-06-11 PROCEDURE — 99213 OFFICE O/P EST LOW 20 MIN: CPT | Performed by: FAMILY MEDICINE

## 2025-06-11 PROCEDURE — PBSHW AMB POC HEMOGLOBIN A1C: Performed by: FAMILY MEDICINE

## 2025-06-11 PROCEDURE — 82962 GLUCOSE BLOOD TEST: CPT | Performed by: FAMILY MEDICINE

## 2025-06-11 PROCEDURE — PBSHW AMB POC GLUCOSE BLOOD, BY GLUCOSE MONITORING DEVICE: Performed by: FAMILY MEDICINE

## 2025-06-11 SDOH — ECONOMIC STABILITY: FOOD INSECURITY: WITHIN THE PAST 12 MONTHS, YOU WORRIED THAT YOUR FOOD WOULD RUN OUT BEFORE YOU GOT MONEY TO BUY MORE.: NEVER TRUE

## 2025-06-11 SDOH — ECONOMIC STABILITY: FOOD INSECURITY: WITHIN THE PAST 12 MONTHS, THE FOOD YOU BOUGHT JUST DIDN'T LAST AND YOU DIDN'T HAVE MONEY TO GET MORE.: NEVER TRUE

## 2025-06-11 ASSESSMENT — PATIENT HEALTH QUESTIONNAIRE - PHQ9
1. LITTLE INTEREST OR PLEASURE IN DOING THINGS: SEVERAL DAYS
SUM OF ALL RESPONSES TO PHQ QUESTIONS 1-9: 2
2. FEELING DOWN, DEPRESSED OR HOPELESS: SEVERAL DAYS
SUM OF ALL RESPONSES TO PHQ QUESTIONS 1-9: 2

## 2025-06-11 NOTE — PROGRESS NOTES
Kolby Butler (:  2000) is a 24 y.o. female,Established patient, here for evaluation of the following chief complaint(s):  acute visit  (Pt is concerned about blood sugar being high and wants it checked )      Assessment & Plan   ASSESSMENT/PLAN:  Assessment & Plan  1. Prediabetes. Chronic, stable, at goal.  Encouraged lifestyle changes and information given in AVS for diet and exercise.   - Random glucose level is 99, which is within the normal range. Hemoglobin A1c is at the upper limit of normal at 5.6.  - Reports symptoms of increased thirst, frequent urination, and burning sensation in fingertips.  - Not currently following a low-carb diet or regular exercise regimen.  - Advised to follow a low-carb diet and engage in at least 150 minutes of exercise per week, ideally 30 minutes five times a week. Information about prediabetes, a low-carb diet, and exercise recommendations will be provided in the after-visit summary.    Results for orders placed or performed in visit on 25   AMB POC GLUCOSE BLOOD, BY GLUCOSE MONITORING DEVICE   Result Value Ref Range    Glucose, POC 99 MG/DL   AMB POC HEMOGLOBIN A1C   Result Value Ref Range    Hemoglobin A1C, POC 5.6 %         1. Prediabetes  -     AMB POC GLUCOSE BLOOD, BY GLUCOSE MONITORING DEVICE  -     AMB POC HEMOGLOBIN A1C      Return if symptoms worsen or fail to improve.           The patient (or guardian, if applicable) and other individuals in attendance with the patient were advised that Artificial Intelligence will be utilized during this visit to record and process the conversation to generate a clinical note. The patient (or guardian, if applicable) and other individuals in attendance at the appointment consented to the use of AI, including the recording.      Subjective   SUBJECTIVE/OBJECTIVE:  History of Present Illness  The patient presents for evaluation of blood sugar. She is accompanied by an .    She has been monitoring her blood

## 2025-06-11 NOTE — PROGRESS NOTES
RM:2    Chief Complaint   Patient presents with    acute visit      Pt is concerned about blood sugar being high and wants it checked        Vitals:    06/11/25 1114   BP: 107/74   BP Site: Left Upper Arm   Patient Position: Sitting   BP Cuff Size: Large Adult   Pulse: 64   Resp: 16   Temp: 98.3 °F (36.8 °C)   TempSrc: Oral   SpO2: 98%   Weight: 82.2 kg (181 lb 3.2 oz)   Height: 1.6 m (5' 3\")        FASTING: Yes    \"Have you been to the ER, urgent care clinic since your last visit?  Hospitalized since your last visit?\"    NO    “Have you seen or consulted any other health care providers outside of [facility] since your last visit?”    NO    - [facility] - [phone]        Click Here for Release of Records Request

## 2025-07-11 ENCOUNTER — OFFICE VISIT (OUTPATIENT)
Age: 25
End: 2025-07-11
Payer: MEDICAID

## 2025-07-11 VITALS
OXYGEN SATURATION: 98 % | RESPIRATION RATE: 18 BRPM | HEART RATE: 64 BPM | WEIGHT: 178.8 LBS | HEIGHT: 63 IN | SYSTOLIC BLOOD PRESSURE: 113 MMHG | DIASTOLIC BLOOD PRESSURE: 84 MMHG | TEMPERATURE: 98.3 F | BODY MASS INDEX: 31.68 KG/M2

## 2025-07-11 DIAGNOSIS — L30.4 INTERTRIGO: ICD-10-CM

## 2025-07-11 DIAGNOSIS — E55.9 VITAMIN D DEFICIENCY: ICD-10-CM

## 2025-07-11 DIAGNOSIS — Z00.00 WELL WOMAN EXAM WITHOUT GYNECOLOGICAL EXAM: Primary | ICD-10-CM

## 2025-07-11 DIAGNOSIS — R73.03 PREDIABETES: ICD-10-CM

## 2025-07-11 DIAGNOSIS — E06.3 HYPOTHYROIDISM DUE TO HASHIMOTO'S THYROIDITIS: ICD-10-CM

## 2025-07-11 PROCEDURE — 99395 PREV VISIT EST AGE 18-39: CPT | Performed by: FAMILY MEDICINE

## 2025-07-11 RX ORDER — THERMOMETER, ELECTRONIC,ORAL
EACH MISCELLANEOUS 2 TIMES DAILY
COMMUNITY
Start: 2025-06-24 | End: 2025-07-11 | Stop reason: SDUPTHER

## 2025-07-11 RX ORDER — HYDROCORTISONE 25 MG/G
CREAM TOPICAL
Qty: 28 G | Refills: 1 | Status: SHIPPED | OUTPATIENT
Start: 2025-07-11

## 2025-07-11 RX ORDER — THERMOMETER, ELECTRONIC,ORAL
EACH MISCELLANEOUS 2 TIMES DAILY
Qty: 42 G | Refills: 0 | Status: SHIPPED | OUTPATIENT
Start: 2025-07-11

## 2025-07-11 SDOH — ECONOMIC STABILITY: FOOD INSECURITY: WITHIN THE PAST 12 MONTHS, YOU WORRIED THAT YOUR FOOD WOULD RUN OUT BEFORE YOU GOT MONEY TO BUY MORE.: NEVER TRUE

## 2025-07-11 SDOH — ECONOMIC STABILITY: FOOD INSECURITY: WITHIN THE PAST 12 MONTHS, THE FOOD YOU BOUGHT JUST DIDN'T LAST AND YOU DIDN'T HAVE MONEY TO GET MORE.: NEVER TRUE

## 2025-07-11 ASSESSMENT — PATIENT HEALTH QUESTIONNAIRE - PHQ9
SUM OF ALL RESPONSES TO PHQ QUESTIONS 1-9: 0
SUM OF ALL RESPONSES TO PHQ QUESTIONS 1-9: 0
2. FEELING DOWN, DEPRESSED OR HOPELESS: NOT AT ALL
1. LITTLE INTEREST OR PLEASURE IN DOING THINGS: NOT AT ALL
SUM OF ALL RESPONSES TO PHQ QUESTIONS 1-9: 0
SUM OF ALL RESPONSES TO PHQ QUESTIONS 1-9: 0

## 2025-07-11 ASSESSMENT — ENCOUNTER SYMPTOMS
GASTROINTESTINAL NEGATIVE: 1
ALLERGIC/IMMUNOLOGIC NEGATIVE: 1
RESPIRATORY NEGATIVE: 1
EYES NEGATIVE: 1

## 2025-07-11 NOTE — CONSULTS
Session ID: [id number]  Session Duration: Longer than 54 minutes  Language: Caitlyn   ID: #[id number]   Name: Cami

## 2025-07-13 LAB
25(OH)D3 SERPL-MCNC: <9 NG/ML (ref 30–100)
ALBUMIN SERPL-MCNC: 4.1 G/DL (ref 3.5–5)
ALBUMIN/GLOB SERPL: 1.2 (ref 1.1–2.2)
ALP SERPL-CCNC: 76 U/L (ref 45–117)
ALT SERPL-CCNC: 30 U/L (ref 12–78)
ANION GAP SERPL CALC-SCNC: 5 MMOL/L (ref 2–12)
AST SERPL-CCNC: 21 U/L (ref 15–37)
BASOPHILS # BLD: 0.03 K/UL (ref 0–0.1)
BASOPHILS NFR BLD: 0.5 % (ref 0–1)
BILIRUB SERPL-MCNC: 0.4 MG/DL (ref 0.2–1)
BUN SERPL-MCNC: 10 MG/DL (ref 6–20)
BUN/CREAT SERPL: 15 (ref 12–20)
CALCIUM SERPL-MCNC: 9.3 MG/DL (ref 8.5–10.1)
CHLORIDE SERPL-SCNC: 107 MMOL/L (ref 97–108)
CHOLEST SERPL-MCNC: 166 MG/DL
CO2 SERPL-SCNC: 26 MMOL/L (ref 21–32)
CREAT SERPL-MCNC: 0.68 MG/DL (ref 0.55–1.02)
DIFFERENTIAL METHOD BLD: NORMAL
EOSINOPHIL # BLD: 0.18 K/UL (ref 0–0.4)
EOSINOPHIL NFR BLD: 3.3 % (ref 0–7)
ERYTHROCYTE [DISTWIDTH] IN BLOOD BY AUTOMATED COUNT: 12.8 % (ref 11.5–14.5)
EST. AVERAGE GLUCOSE BLD GHB EST-MCNC: 111 MG/DL
GLOBULIN SER CALC-MCNC: 3.5 G/DL (ref 2–4)
GLUCOSE SERPL-MCNC: 90 MG/DL (ref 65–100)
HBA1C MFR BLD: 5.5 % (ref 4–5.6)
HCT VFR BLD AUTO: 40.7 % (ref 35–47)
HDLC SERPL-MCNC: 47 MG/DL
HDLC SERPL: 3.5 (ref 0–5)
HGB BLD-MCNC: 12.7 G/DL (ref 11.5–16)
IMM GRANULOCYTES # BLD AUTO: 0.01 K/UL (ref 0–0.04)
IMM GRANULOCYTES NFR BLD AUTO: 0.2 % (ref 0–0.5)
LDLC SERPL CALC-MCNC: 96.2 MG/DL (ref 0–100)
LYMPHOCYTES # BLD: 1.76 K/UL (ref 0.8–3.5)
LYMPHOCYTES NFR BLD: 31.8 % (ref 12–49)
MCH RBC QN AUTO: 28 PG (ref 26–34)
MCHC RBC AUTO-ENTMCNC: 31.2 G/DL (ref 30–36.5)
MCV RBC AUTO: 89.6 FL (ref 80–99)
MONOCYTES # BLD: 0.42 K/UL (ref 0–1)
MONOCYTES NFR BLD: 7.6 % (ref 5–13)
NEUTS SEG # BLD: 3.13 K/UL (ref 1.8–8)
NEUTS SEG NFR BLD: 56.6 % (ref 32–75)
NRBC # BLD: 0 K/UL (ref 0–0.01)
NRBC BLD-RTO: 0 PER 100 WBC
PLATELET # BLD AUTO: 367 K/UL (ref 150–400)
PMV BLD AUTO: 11.1 FL (ref 8.9–12.9)
POTASSIUM SERPL-SCNC: 4.3 MMOL/L (ref 3.5–5.1)
PROT SERPL-MCNC: 7.6 G/DL (ref 6.4–8.2)
RBC # BLD AUTO: 4.54 M/UL (ref 3.8–5.2)
SODIUM SERPL-SCNC: 138 MMOL/L (ref 136–145)
T4 FREE SERPL-MCNC: 1.1 NG/DL (ref 0.8–1.5)
TRIGL SERPL-MCNC: 114 MG/DL
TSH SERPL DL<=0.05 MIU/L-ACNC: 5.28 UIU/ML (ref 0.36–3.74)
VLDLC SERPL CALC-MCNC: 22.8 MG/DL
WBC # BLD AUTO: 5.5 K/UL (ref 3.6–11)

## 2025-07-27 ENCOUNTER — RESULTS FOLLOW-UP (OUTPATIENT)
Age: 25
End: 2025-07-27

## 2025-07-27 DIAGNOSIS — E55.9 VITAMIN D DEFICIENCY: Primary | ICD-10-CM

## 2025-07-27 DIAGNOSIS — E06.3 HYPOTHYROIDISM DUE TO HASHIMOTO'S THYROIDITIS: ICD-10-CM

## 2025-07-27 RX ORDER — LEVOTHYROXINE SODIUM 50 UG/1
50 TABLET ORAL DAILY
Qty: 90 TABLET | Refills: 0 | Status: SHIPPED | OUTPATIENT
Start: 2025-07-27 | End: 2025-10-25

## 2025-07-27 RX ORDER — ERGOCALCIFEROL 1.25 MG/1
50000 CAPSULE, LIQUID FILLED ORAL WEEKLY
Qty: 12 CAPSULE | Refills: 1 | Status: SHIPPED | OUTPATIENT
Start: 2025-07-27 | End: 2026-01-23